# Patient Record
Sex: FEMALE | Race: WHITE | NOT HISPANIC OR LATINO | URBAN - METROPOLITAN AREA
[De-identification: names, ages, dates, MRNs, and addresses within clinical notes are randomized per-mention and may not be internally consistent; named-entity substitution may affect disease eponyms.]

---

## 2017-08-10 ENCOUNTER — APPOINTMENT (RX ONLY)
Dept: URBAN - METROPOLITAN AREA OTHER 13 | Facility: OTHER | Age: 47
Setting detail: DERMATOLOGY
End: 2017-08-10

## 2017-08-10 DIAGNOSIS — L21.8 OTHER SEBORRHEIC DERMATITIS: ICD-10-CM

## 2017-08-10 PROCEDURE — ? TREATMENT REGIMEN

## 2017-08-10 PROCEDURE — ? COUNSELING

## 2017-08-10 PROCEDURE — 99212 OFFICE O/P EST SF 10 MIN: CPT

## 2017-08-10 ASSESSMENT — LOCATION SIMPLE DESCRIPTION DERM: LOCATION SIMPLE: SCALP

## 2017-08-10 ASSESSMENT — LOCATION ZONE DERM: LOCATION ZONE: SCALP

## 2017-08-10 ASSESSMENT — LOCATION DETAILED DESCRIPTION DERM: LOCATION DETAILED: RIGHT SUPERIOR PARIETAL SCALP

## 2017-08-10 NOTE — PROCEDURE: TREATMENT REGIMEN
Plan: PRN
Continue Regimen: TGel shampoo leave on 3 to 5 minutes alternate with Selsun Blue
Modify Regimen: Do not scrub area \\nDo not go to bed with wet hair
Detail Level: Generalized